# Patient Record
Sex: FEMALE
[De-identification: names, ages, dates, MRNs, and addresses within clinical notes are randomized per-mention and may not be internally consistent; named-entity substitution may affect disease eponyms.]

---

## 2017-08-30 ENCOUNTER — TRANSCRIPTION ENCOUNTER (OUTPATIENT)
Age: 62
End: 2017-08-30

## 2017-08-30 ENCOUNTER — INPATIENT (INPATIENT)
Facility: HOSPITAL | Age: 62
LOS: 0 days | Discharge: HOME | DRG: 419 | End: 2017-08-31
Attending: SPECIALIST | Admitting: SPECIALIST
Payer: MEDICAID

## 2017-08-30 ENCOUNTER — RESULT REVIEW (OUTPATIENT)
Age: 62
End: 2017-08-30

## 2017-08-30 VITALS
SYSTOLIC BLOOD PRESSURE: 146 MMHG | HEIGHT: 60 IN | WEIGHT: 145.51 LBS | HEART RATE: 83 BPM | OXYGEN SATURATION: 97 % | DIASTOLIC BLOOD PRESSURE: 92 MMHG | RESPIRATION RATE: 19 BRPM | TEMPERATURE: 98 F

## 2017-08-30 DIAGNOSIS — K81.9 CHOLECYSTITIS, UNSPECIFIED: ICD-10-CM

## 2017-08-30 DIAGNOSIS — K81.0 ACUTE CHOLECYSTITIS: ICD-10-CM

## 2017-08-30 DIAGNOSIS — Z98.51 TUBAL LIGATION STATUS: Chronic | ICD-10-CM

## 2017-08-30 DIAGNOSIS — Z98.891 HISTORY OF UTERINE SCAR FROM PREVIOUS SURGERY: Chronic | ICD-10-CM

## 2017-08-30 LAB
ALBUMIN SERPL ELPH-MCNC: 4.1 G/DL — SIGNIFICANT CHANGE UP (ref 3.3–5)
ALP SERPL-CCNC: 83 U/L — SIGNIFICANT CHANGE UP (ref 40–120)
ALT FLD-CCNC: 16 U/L — SIGNIFICANT CHANGE UP (ref 12–78)
AMYLASE P1 CFR SERPL: 65 U/L — SIGNIFICANT CHANGE UP (ref 25–115)
ANION GAP SERPL CALC-SCNC: 10 MMOL/L — SIGNIFICANT CHANGE UP (ref 5–17)
APPEARANCE UR: CLEAR — SIGNIFICANT CHANGE UP
AST SERPL-CCNC: 14 U/L — LOW (ref 15–37)
BILIRUB SERPL-MCNC: 0.5 MG/DL — SIGNIFICANT CHANGE UP (ref 0.2–1.2)
BILIRUB UR-MCNC: NEGATIVE — SIGNIFICANT CHANGE UP
BUN SERPL-MCNC: 11 MG/DL — SIGNIFICANT CHANGE UP (ref 7–23)
CALCIUM SERPL-MCNC: 9.2 MG/DL — SIGNIFICANT CHANGE UP (ref 8.5–10.1)
CHLORIDE SERPL-SCNC: 103 MMOL/L — SIGNIFICANT CHANGE UP (ref 96–108)
CO2 SERPL-SCNC: 25 MMOL/L — SIGNIFICANT CHANGE UP (ref 22–31)
COLOR SPEC: YELLOW — SIGNIFICANT CHANGE UP
CREAT SERPL-MCNC: 0.72 MG/DL — SIGNIFICANT CHANGE UP (ref 0.5–1.3)
DIFF PNL FLD: ABNORMAL
GLUCOSE SERPL-MCNC: 106 MG/DL — HIGH (ref 70–99)
GLUCOSE UR QL: NEGATIVE — SIGNIFICANT CHANGE UP
HCT VFR BLD CALC: 45.2 % — HIGH (ref 34.5–45)
HGB BLD-MCNC: 15.6 G/DL — HIGH (ref 11.5–15.5)
KETONES UR-MCNC: NEGATIVE — SIGNIFICANT CHANGE UP
LEUKOCYTE ESTERASE UR-ACNC: ABNORMAL
LIDOCAIN IGE QN: 152 U/L — SIGNIFICANT CHANGE UP (ref 73–393)
MCHC RBC-ENTMCNC: 31.4 PG — SIGNIFICANT CHANGE UP (ref 27–34)
MCHC RBC-ENTMCNC: 34.5 GM/DL — SIGNIFICANT CHANGE UP (ref 32–36)
MCV RBC AUTO: 91.2 FL — SIGNIFICANT CHANGE UP (ref 80–100)
NITRITE UR-MCNC: NEGATIVE — SIGNIFICANT CHANGE UP
PH UR: 7 — SIGNIFICANT CHANGE UP (ref 5–8)
PLATELET # BLD AUTO: 299 K/UL — SIGNIFICANT CHANGE UP (ref 150–400)
POTASSIUM SERPL-MCNC: 3.5 MMOL/L — SIGNIFICANT CHANGE UP (ref 3.5–5.3)
POTASSIUM SERPL-SCNC: 3.5 MMOL/L — SIGNIFICANT CHANGE UP (ref 3.5–5.3)
PROT SERPL-MCNC: 8.2 G/DL — SIGNIFICANT CHANGE UP (ref 6–8.3)
PROT UR-MCNC: NEGATIVE — SIGNIFICANT CHANGE UP
RBC # BLD: 4.95 M/UL — SIGNIFICANT CHANGE UP (ref 3.8–5.2)
RBC # FLD: 12.2 % — SIGNIFICANT CHANGE UP (ref 10.3–14.5)
SODIUM SERPL-SCNC: 138 MMOL/L — SIGNIFICANT CHANGE UP (ref 135–145)
SP GR SPEC: 1 — LOW (ref 1.01–1.02)
UROBILINOGEN FLD QL: NEGATIVE — SIGNIFICANT CHANGE UP
WBC # BLD: 12.7 K/UL — HIGH (ref 3.8–10.5)
WBC # FLD AUTO: 12.7 K/UL — HIGH (ref 3.8–10.5)

## 2017-08-30 PROCEDURE — 76705 ECHO EXAM OF ABDOMEN: CPT | Mod: 26

## 2017-08-30 PROCEDURE — 99285 EMERGENCY DEPT VISIT HI MDM: CPT

## 2017-08-30 PROCEDURE — 74176 CT ABD & PELVIS W/O CONTRAST: CPT | Mod: 26

## 2017-08-30 PROCEDURE — 93010 ELECTROCARDIOGRAM REPORT: CPT

## 2017-08-30 PROCEDURE — 47562 LAPAROSCOPIC CHOLECYSTECTOMY: CPT | Mod: AS

## 2017-08-30 PROCEDURE — 88304 TISSUE EXAM BY PATHOLOGIST: CPT | Mod: 26

## 2017-08-30 RX ORDER — ENOXAPARIN SODIUM 100 MG/ML
40 INJECTION SUBCUTANEOUS DAILY
Qty: 0 | Refills: 0 | Status: DISCONTINUED | OUTPATIENT
Start: 2017-08-30 | End: 2017-08-31

## 2017-08-30 RX ORDER — SODIUM CHLORIDE 9 MG/ML
1000 INJECTION INTRAMUSCULAR; INTRAVENOUS; SUBCUTANEOUS
Qty: 0 | Refills: 0 | Status: DISCONTINUED | OUTPATIENT
Start: 2017-08-30 | End: 2017-08-31

## 2017-08-30 RX ORDER — CEFAZOLIN SODIUM 1 G
2000 VIAL (EA) INJECTION ONCE
Qty: 0 | Refills: 0 | Status: DISCONTINUED | OUTPATIENT
Start: 2017-08-30 | End: 2017-08-30

## 2017-08-30 RX ORDER — ONDANSETRON 8 MG/1
4 TABLET, FILM COATED ORAL
Qty: 0 | Refills: 0 | Status: DISCONTINUED | OUTPATIENT
Start: 2017-08-30 | End: 2017-08-31

## 2017-08-30 RX ORDER — SODIUM CHLORIDE 9 MG/ML
1000 INJECTION INTRAMUSCULAR; INTRAVENOUS; SUBCUTANEOUS ONCE
Qty: 0 | Refills: 0 | Status: COMPLETED | OUTPATIENT
Start: 2017-08-30 | End: 2017-08-30

## 2017-08-30 RX ORDER — ONDANSETRON 8 MG/1
4 TABLET, FILM COATED ORAL ONCE
Qty: 0 | Refills: 0 | Status: COMPLETED | OUTPATIENT
Start: 2017-08-30 | End: 2017-08-30

## 2017-08-30 RX ORDER — KETOROLAC TROMETHAMINE 30 MG/ML
30 SYRINGE (ML) INJECTION ONCE
Qty: 0 | Refills: 0 | Status: DISCONTINUED | OUTPATIENT
Start: 2017-08-30 | End: 2017-08-30

## 2017-08-30 RX ORDER — MORPHINE SULFATE 50 MG/1
2 CAPSULE, EXTENDED RELEASE ORAL EVERY 4 HOURS
Qty: 0 | Refills: 0 | Status: DISCONTINUED | OUTPATIENT
Start: 2017-08-30 | End: 2017-08-31

## 2017-08-30 RX ORDER — HYDROMORPHONE HYDROCHLORIDE 2 MG/ML
0.5 INJECTION INTRAMUSCULAR; INTRAVENOUS; SUBCUTANEOUS
Qty: 0 | Refills: 0 | Status: DISCONTINUED | OUTPATIENT
Start: 2017-08-30 | End: 2017-08-30

## 2017-08-30 RX ORDER — KETOROLAC TROMETHAMINE 30 MG/ML
15 SYRINGE (ML) INJECTION EVERY 6 HOURS
Qty: 0 | Refills: 0 | Status: DISCONTINUED | OUTPATIENT
Start: 2017-08-30 | End: 2017-08-31

## 2017-08-30 RX ORDER — PIPERACILLIN AND TAZOBACTAM 4; .5 G/20ML; G/20ML
3.38 INJECTION, POWDER, LYOPHILIZED, FOR SOLUTION INTRAVENOUS ONCE
Qty: 0 | Refills: 0 | Status: COMPLETED | OUTPATIENT
Start: 2017-08-30 | End: 2017-08-30

## 2017-08-30 RX ORDER — SODIUM CHLORIDE 9 MG/ML
1000 INJECTION, SOLUTION INTRAVENOUS
Qty: 0 | Refills: 0 | Status: DISCONTINUED | OUTPATIENT
Start: 2017-08-30 | End: 2017-08-30

## 2017-08-30 RX ADMIN — Medication 15 MILLIGRAM(S): at 22:22

## 2017-08-30 RX ADMIN — Medication 30 MILLIGRAM(S): at 12:35

## 2017-08-30 RX ADMIN — Medication 200 MILLIGRAM(S): at 18:31

## 2017-08-30 RX ADMIN — SODIUM CHLORIDE 33.33 MILLILITER(S): 9 INJECTION INTRAMUSCULAR; INTRAVENOUS; SUBCUTANEOUS at 12:40

## 2017-08-30 RX ADMIN — ONDANSETRON 4 MILLIGRAM(S): 8 TABLET, FILM COATED ORAL at 12:36

## 2017-08-30 RX ADMIN — Medication 15 MILLIGRAM(S): at 22:55

## 2017-08-30 RX ADMIN — PIPERACILLIN AND TAZOBACTAM 200 GRAM(S): 4; .5 INJECTION, POWDER, LYOPHILIZED, FOR SOLUTION INTRAVENOUS at 14:33

## 2017-08-30 RX ADMIN — Medication 30 MILLIGRAM(S): at 15:10

## 2017-08-30 NOTE — H&P ADULT - PROBLEM SELECTOR PLAN 1
lap/open carrie. The indication for, alternatives to and possible complications of the procedure were discussed with the patient, and all questions were answered

## 2017-08-30 NOTE — ED ADULT NURSE NOTE - CHPI ED SYMPTOMS NEG
no diarrhea/no chills/no fever/no hematuria/no blood in stool/no burning urination/no abdominal distension

## 2017-08-30 NOTE — ED PROVIDER NOTE - OBJECTIVE STATEMENT
63 yo white female with acute onset of right flank/ruq and abdominal pain with nausea all since early morning today. No fever or chills. No dysuria. No CP/SOB

## 2017-08-30 NOTE — ED PROVIDER NOTE - CONSTITUTIONAL, MLM
normal... Female, uncomfortable appearing, well nourished, awake, alert, oriented to person, place, time/situation and in moderate apparent distress.

## 2017-08-30 NOTE — ED ADULT NURSE NOTE - OBJECTIVE STATEMENT
pt stating right flank pain that radiates to the right abdomen since this am at 0300. woke her out of sleep. denies fever, chills, cp, sob, urinary and bowel symptoms. RUQ tender on palpation. soft, non distended.

## 2017-08-31 ENCOUNTER — TRANSCRIPTION ENCOUNTER (OUTPATIENT)
Age: 62
End: 2017-08-31

## 2017-08-31 VITALS
SYSTOLIC BLOOD PRESSURE: 146 MMHG | HEART RATE: 69 BPM | TEMPERATURE: 99 F | DIASTOLIC BLOOD PRESSURE: 81 MMHG | RESPIRATION RATE: 17 BRPM | OXYGEN SATURATION: 98 %

## 2017-08-31 RX ADMIN — Medication 15 MILLIGRAM(S): at 10:58

## 2017-08-31 NOTE — DISCHARGE NOTE ADULT - PATIENT PORTAL LINK FT
“You can access the [facility] Patient Portal, offered by [facility], by registering with the following website: [email or web address]”

## 2017-08-31 NOTE — DISCHARGE NOTE ADULT - NS AS ACTIVITY OBS
Do not drive or operate machinery/OK to drive when no pain/Showering allowed/Walking-Indoors allowed/Walking-Outdoors allowed/Stairs allowed

## 2017-08-31 NOTE — DISCHARGE NOTE ADULT - CARE PROVIDER_API CALL
Jono Miller), [facility]  [address] [place] [address]  Phone: [phone]  Fax: [phone]

## 2017-08-31 NOTE — DISCHARGE NOTE ADULT - CARE PLAN
Principal Discharge DX:	Acute cholecystitis  Goal:	na  Instructions for follow-up, activity and diet:	as above

## 2017-08-31 NOTE — DISCHARGE NOTE ADULT - MEDICATION SUMMARY - MEDICATIONS TO TAKE
I will START or STAY ON the medications listed below when I get home from the hospital:    Tierrarel  --  by mouth   -- Indication: For .

## 2017-09-01 LAB — SURGICAL PATHOLOGY FINAL REPORT - CH: SIGNIFICANT CHANGE UP

## 2017-09-02 LAB
-  AMIKACIN: SIGNIFICANT CHANGE UP
-  AMPICILLIN/SULBACTAM: SIGNIFICANT CHANGE UP
-  AMPICILLIN: SIGNIFICANT CHANGE UP
-  AZTREONAM: SIGNIFICANT CHANGE UP
-  CEFAZOLIN: SIGNIFICANT CHANGE UP
-  CEFEPIME: SIGNIFICANT CHANGE UP
-  CEFOXITIN: SIGNIFICANT CHANGE UP
-  CEFTAZIDIME: SIGNIFICANT CHANGE UP
-  CEFTRIAXONE: SIGNIFICANT CHANGE UP
-  CIPROFLOXACIN: SIGNIFICANT CHANGE UP
-  ERTAPENEM: SIGNIFICANT CHANGE UP
-  GENTAMICIN: SIGNIFICANT CHANGE UP
-  IMIPENEM: SIGNIFICANT CHANGE UP
-  LEVOFLOXACIN: SIGNIFICANT CHANGE UP
-  MEROPENEM: SIGNIFICANT CHANGE UP
-  PIPERACILLIN/TAZOBACTAM: SIGNIFICANT CHANGE UP
-  TOBRAMYCIN: SIGNIFICANT CHANGE UP
-  TRIMETHOPRIM/SULFAMETHOXAZOLE: SIGNIFICANT CHANGE UP
METHOD TYPE: SIGNIFICANT CHANGE UP

## 2017-09-03 DIAGNOSIS — Z88.6 ALLERGY STATUS TO ANALGESIC AGENT: ICD-10-CM

## 2017-09-03 DIAGNOSIS — I10 ESSENTIAL (PRIMARY) HYPERTENSION: ICD-10-CM

## 2017-09-03 DIAGNOSIS — K81.0 ACUTE CHOLECYSTITIS: ICD-10-CM

## 2017-09-03 DIAGNOSIS — Z88.8 ALLERGY STATUS TO OTHER DRUGS, MEDICAMENTS AND BIOLOGICAL SUBSTANCES: ICD-10-CM

## 2017-09-03 DIAGNOSIS — Z88.4 ALLERGY STATUS TO ANESTHETIC AGENT: ICD-10-CM

## 2017-09-03 DIAGNOSIS — Z88.5 ALLERGY STATUS TO NARCOTIC AGENT: ICD-10-CM

## 2017-09-03 DIAGNOSIS — E78.5 HYPERLIPIDEMIA, UNSPECIFIED: ICD-10-CM

## 2017-09-03 DIAGNOSIS — Z88.1 ALLERGY STATUS TO OTHER ANTIBIOTIC AGENTS STATUS: ICD-10-CM

## 2017-09-03 DIAGNOSIS — F17.210 NICOTINE DEPENDENCE, CIGARETTES, UNCOMPLICATED: ICD-10-CM

## 2017-09-05 LAB
CULTURE RESULTS: SIGNIFICANT CHANGE UP
ORGANISM # SPEC MICROSCOPIC CNT: SIGNIFICANT CHANGE UP
ORGANISM # SPEC MICROSCOPIC CNT: SIGNIFICANT CHANGE UP
SPECIMEN SOURCE: SIGNIFICANT CHANGE UP

## 2017-12-18 PROCEDURE — 87070 CULTURE OTHR SPECIMN AEROBIC: CPT

## 2017-12-18 PROCEDURE — 88304 TISSUE EXAM BY PATHOLOGIST: CPT

## 2017-12-18 PROCEDURE — 81001 URINALYSIS AUTO W/SCOPE: CPT

## 2017-12-18 PROCEDURE — 83690 ASSAY OF LIPASE: CPT

## 2017-12-18 PROCEDURE — 80053 COMPREHEN METABOLIC PANEL: CPT

## 2017-12-18 PROCEDURE — 86850 RBC ANTIBODY SCREEN: CPT

## 2017-12-18 PROCEDURE — 93005 ELECTROCARDIOGRAM TRACING: CPT

## 2017-12-18 PROCEDURE — 87186 SC STD MICRODIL/AGAR DIL: CPT

## 2017-12-18 PROCEDURE — 96375 TX/PRO/DX INJ NEW DRUG ADDON: CPT

## 2017-12-18 PROCEDURE — 99285 EMERGENCY DEPT VISIT HI MDM: CPT | Mod: 25

## 2017-12-18 PROCEDURE — 85027 COMPLETE CBC AUTOMATED: CPT

## 2017-12-18 PROCEDURE — 86901 BLOOD TYPING SEROLOGIC RH(D): CPT

## 2017-12-18 PROCEDURE — 86900 BLOOD TYPING SEROLOGIC ABO: CPT

## 2017-12-18 PROCEDURE — 82150 ASSAY OF AMYLASE: CPT

## 2017-12-18 PROCEDURE — 96365 THER/PROPH/DIAG IV INF INIT: CPT

## 2017-12-18 PROCEDURE — 76705 ECHO EXAM OF ABDOMEN: CPT

## 2017-12-18 PROCEDURE — 74176 CT ABD & PELVIS W/O CONTRAST: CPT

## 2018-03-01 ENCOUNTER — OUTPATIENT (OUTPATIENT)
Dept: OUTPATIENT SERVICES | Facility: HOSPITAL | Age: 63
LOS: 1 days | End: 2018-03-01
Payer: MEDICAID

## 2018-03-01 DIAGNOSIS — Z98.891 HISTORY OF UTERINE SCAR FROM PREVIOUS SURGERY: Chronic | ICD-10-CM

## 2018-03-01 DIAGNOSIS — Z98.51 TUBAL LIGATION STATUS: Chronic | ICD-10-CM

## 2018-03-01 PROCEDURE — G9001: CPT

## 2018-03-28 DIAGNOSIS — R69 ILLNESS, UNSPECIFIED: ICD-10-CM

## 2018-03-28 PROBLEM — I10 ESSENTIAL (PRIMARY) HYPERTENSION: Chronic | Status: ACTIVE | Noted: 2017-08-30

## 2018-11-13 ENCOUNTER — TRANSCRIPTION ENCOUNTER (OUTPATIENT)
Age: 63
End: 2018-11-13

## 2020-08-10 ENCOUNTER — APPOINTMENT (OUTPATIENT)
Dept: SURGERY | Facility: CLINIC | Age: 65
End: 2020-08-10
Payer: MEDICAID

## 2020-08-10 DIAGNOSIS — Z86.79 PERSONAL HISTORY OF OTHER DISEASES OF THE CIRCULATORY SYSTEM: ICD-10-CM

## 2020-08-10 DIAGNOSIS — Z78.9 OTHER SPECIFIED HEALTH STATUS: ICD-10-CM

## 2020-08-10 PROCEDURE — 19020 MASTOTOMY EXPL DRG ABSC DP: CPT | Mod: 59

## 2020-08-10 PROCEDURE — 99203 OFFICE O/P NEW LOW 30 MIN: CPT | Mod: 25

## 2020-08-10 RX ORDER — CEPHALEXIN 250 MG/1
250 TABLET ORAL EVERY 6 HOURS
Qty: 28 | Refills: 0 | Status: ACTIVE | COMMUNITY
Start: 2020-08-10 | End: 1900-01-01

## 2020-08-12 NOTE — REVIEW OF SYSTEMS
[Breast Lump] : breast lump [Negative] : Heme/Lymph [Skin Lesions] : no skin lesions [de-identified] : right breast abscess [FreeTextEntry1] : hx of back pain

## 2020-08-12 NOTE — HISTORY OF PRESENT ILLNESS
[FreeTextEntry1] : 65 year old white female who presents  with a right breast abscess.  Pt states she has a long hx of a cyst in the area, she developed pain and swelling over the past four days.  She denies fevers , chills or night sweats.

## 2020-08-12 NOTE — CONSULT LETTER
[Dear  ___] : Dear  [unfilled], [Courtesy Letter:] : I had the pleasure of seeing your patient, [unfilled], in my office today. [Consult Closing:] : Thank you very much for allowing me to participate in the care of this patient.  If you have any questions, please do not hesitate to contact me. [Sincerely,] : Sincerely, [FreeTextEntry1] : The right breast abscess you identified was appreciated.  An incision and drainage was done in the office.  The wound was packed open and a dry sterile dressing was applied. Plans to start Keflex and warm soaks three times a day have been made.  Follow up in the office in one week have been arranged. [FreeTextEntry3] : Dean Coppola MD\par

## 2020-08-12 NOTE — PHYSICAL EXAM
[Normocephalic] : normocephalic [EOMI] : extra ocular movement intact [PERRL] : pupils equal, round and reactive to light [Sclera nonicteric] : sclera nonicteric [Supple] : supple [Normal Sinus Rhythm] : normal sinus rhythm [Clear to Auscultation Bilat] : clear to auscultation bilaterally [No Axillary Lymphadenopathy] : no left axillary lymphadenopathy [No Rashes] : no rashes [Breast Nipple Inversion] : nipples not inverted [Breast Nipple Retraction] : nipples not retracted [Breast Nipple Flattening] : nipples not flattened [de-identified] : breast abscess at 12;00 \par I & D done in office with pus [Breast Nipple Fissures] : nipples not fissured [de-identified] : right breast abscess

## 2020-08-13 ENCOUNTER — APPOINTMENT (OUTPATIENT)
Dept: SURGERY | Facility: CLINIC | Age: 65
End: 2020-08-13
Payer: MEDICAID

## 2020-08-13 VITALS — TEMPERATURE: 98.1 F

## 2020-08-13 PROCEDURE — 99024 POSTOP FOLLOW-UP VISIT: CPT

## 2020-08-14 LAB — BACTERIA WND CULT: ABNORMAL

## 2020-08-20 ENCOUNTER — APPOINTMENT (OUTPATIENT)
Dept: SURGERY | Facility: CLINIC | Age: 65
End: 2020-08-20
Payer: MEDICAID

## 2020-08-20 VITALS — TEMPERATURE: 98.2 F

## 2020-08-20 PROCEDURE — 99024 POSTOP FOLLOW-UP VISIT: CPT

## 2020-08-20 NOTE — HISTORY OF PRESENT ILLNESS
[de-identified] : s/p I and D of breast abscess one week ago [de-identified] : Pt improved, without drainage.  She denies fevers or chills.

## 2020-10-15 ENCOUNTER — APPOINTMENT (OUTPATIENT)
Dept: SURGERY | Facility: CLINIC | Age: 65
End: 2020-10-15
Payer: MEDICAID

## 2020-10-15 VITALS — TEMPERATURE: 97.6 F

## 2020-10-15 PROCEDURE — 99213 OFFICE O/P EST LOW 20 MIN: CPT | Mod: 24

## 2020-10-15 NOTE — HISTORY OF PRESENT ILLNESS
[de-identified] : s/p I and D of breast abscess one week ago [de-identified] : Pt c/o 'the lump is back', denies fevers, chills or night sweats.  Last Mammo was 5/2019

## 2020-10-15 NOTE — PHYSICAL EXAM
[Normal Heart Sounds] : normal heart sounds [Normal Breath Sounds] : Normal breath sounds [Normal Rate and Rhythm] : normal rate and rhythm [No Rash or Lesion] : No rash or lesion [Skin Induration] : induration [Alert] : alert [Oriented to Place] : oriented to place [Oriented to Time] : oriented to time [Oriented to Person] : oriented to person [Calm] : calm [de-identified] : benign [de-identified] : right breast cyst, without other masses, axilla without adenopathy [de-identified] : well developed female in no acute distress [de-identified] : without adenopathy [de-identified] : without calf pain or swelling

## 2020-11-13 ENCOUNTER — APPOINTMENT (OUTPATIENT)
Dept: SURGERY | Facility: HOSPITAL | Age: 65
End: 2020-11-13

## 2020-11-19 ENCOUNTER — APPOINTMENT (OUTPATIENT)
Dept: SURGERY | Facility: CLINIC | Age: 65
End: 2020-11-19
Payer: MEDICAID

## 2020-11-19 VITALS — TEMPERATURE: 97.3 F

## 2020-11-19 DIAGNOSIS — N61.1 ABSCESS OF THE BREAST AND NIPPLE: ICD-10-CM

## 2020-11-19 PROCEDURE — 99213 OFFICE O/P EST LOW 20 MIN: CPT

## 2020-11-19 NOTE — HISTORY OF PRESENT ILLNESS
[FreeTextEntry1] : pt s/p I and D of sebaceous cyst or right  breast, with nodule on breast mammo and sono. pt witlhout palpable masses in the area or nipple discharge

## 2020-11-19 NOTE — ASSESSMENT
[FreeTextEntry1] : sebaceous cyst of the right breast to be electively removed\par nodule of left breast to be followed with a repeat sonogram and exam in six months

## 2020-11-19 NOTE — PHYSICAL EXAM
[Normocephalic] : normocephalic [Atraumatic] : atraumatic [EOMI] : extra ocular movement intact [PERRL] : pupils equal, round and reactive to light [Sclera nonicteric] : sclera nonicteric [Supple] : supple [No Supraclavicular Adenopathy] : no supraclavicular adenopathy [No Cervical Adenopathy] : no cervical adenopathy [Clear to Auscultation Bilat] : clear to auscultation bilaterally [Normal Sinus Rhythm] : normal sinus rhythm [Examined in the supine and seated position] : examined in the supine and seated position [Symmetrical] : symmetrical [No dominant masses] : no dominant masses left breast [No Nipple Retraction] : no left nipple retraction [No Nipple Discharge] : no left nipple discharge [Breast Mass Left Breast ___cm] : no masses [Breast Nipple Inversion] : nipples not inverted [Breast Nipple Retraction] : nipples not retracted [Breast Nipple Flattening] : nipples not flattened [Breast Nipple Fissures] : nipples not fissured [Breast Abnormal Lactation (Galactorrhea)] : no galactorrhea [Breast Abnormal Secretion Bloody Fluid] : no bloody discharge [Breast Abnormal Secretion Serous Fluid] : no serous discharge [Breast Abnormal Secretion Opalescent Fluid] : no milky discharge [No Axillary Lymphadenopathy] : no left axillary lymphadenopathy [Soft] : abdomen soft [Not Tender] : non-tender [No Edema] : no edema [No Rashes] : no rashes [No Ulceration] : no ulceration [de-identified] : sebaceous cyst of breast to be removed electively [de-identified] : lesion on Mammo and sono

## 2021-06-07 ENCOUNTER — APPOINTMENT (OUTPATIENT)
Dept: SURGERY | Facility: CLINIC | Age: 66
End: 2021-06-07
Payer: MEDICARE

## 2021-06-07 VITALS
BODY MASS INDEX: 28.47 KG/M2 | OXYGEN SATURATION: 97 % | TEMPERATURE: 98.7 F | WEIGHT: 145 LBS | HEIGHT: 60 IN | HEART RATE: 83 BPM | SYSTOLIC BLOOD PRESSURE: 153 MMHG | DIASTOLIC BLOOD PRESSURE: 94 MMHG

## 2021-06-07 DIAGNOSIS — F17.200 NICOTINE DEPENDENCE, UNSPECIFIED, UNCOMPLICATED: ICD-10-CM

## 2021-06-07 PROCEDURE — 99212 OFFICE O/P EST SF 10 MIN: CPT

## 2021-06-09 ENCOUNTER — APPOINTMENT (OUTPATIENT)
Dept: SURGERY | Facility: CLINIC | Age: 66
End: 2021-06-09
Payer: MEDICARE

## 2021-06-09 VITALS — TEMPERATURE: 98.3 F

## 2021-06-09 DIAGNOSIS — N63.10 UNSPECIFIED LUMP IN THE RIGHT BREAST, UNSPECIFIED QUADRANT: ICD-10-CM

## 2021-06-09 PROCEDURE — 19120 REMOVAL OF BREAST LESION: CPT

## 2021-06-09 NOTE — PROCEDURE
[FreeTextEntry1] : pre op dxj- right breast cyst\par Post op dx- same\par procedure - Excision of right breast cyst\par Surgeon Dr Coppola\par EBL 10 cc\par Condition - stable\par     Pt was placed in the supine position and the area was properly cleaned and draped. The area around the mass was infiltrated with 1 percent lidocaine with epinephrine and after adequate local anesthesia was administered and 5 by 2 cm elliptical incision was made. Using sharp dissection the mass was completely removed. The area was irrigated and cauterized and after hemostasis was achieved the deep subcutaneous tissues were reapproximated with interrupted 3-0 Vicryl sutures. The skin incisions was closed with a running 4-0 Biosyn suture and a dry sterile dressing was placed. Pt tolerated the procedure without incident.

## 2021-06-09 NOTE — ASSESSMENT
[FreeTextEntry1] : "All risks, benefits and options discussed, pt for excision of her right breast cyst.

## 2021-06-09 NOTE — HISTORY OF PRESENT ILLNESS
[FreeTextEntry1] : 66 year old white female who presents c/o that the right breast cyst has returned. She denies any pain or nipple discharge.

## 2021-06-09 NOTE — PHYSICAL EXAM
[Normocephalic] : normocephalic [Atraumatic] : atraumatic [EOMI] : extra ocular movement intact [PERRL] : pupils equal, round and reactive to light [Supple] : supple [No Supraclavicular Adenopathy] : no supraclavicular adenopathy [No Cervical Adenopathy] : no cervical adenopathy [Clear to Auscultation Bilat] : clear to auscultation bilaterally [Normal Sinus Rhythm] : normal sinus rhythm [Normal S1, S2] : normal S1 and S2 [Examined in the supine and seated position] : examined in the supine and seated position [No dominant masses] : no dominant masses left breast [Symmetrical] : symmetrical [No Nipple Retraction] : no left nipple retraction [No Nipple Discharge] : no left nipple discharge [No Axillary Lymphadenopathy] : no left axillary lymphadenopathy [Soft] : abdomen soft [Not Tender] : non-tender [Normal Bowel Sounds] : normal bowel sounds  [No Edema] : no edema [No Rashes] : no rashes [No Ulceration] : no ulceration [Breast Nipple Inversion] : nipples not inverted [Breast Nipple Retraction] : nipples not retracted [Breast Nipple Flattening] : nipples not flattened [Breast Nipple Fissures] : nipples not fissured [Breast Abnormal Lactation (Galactorrhea)] : no galactorrhea [Breast Abnormal Secretion Bloody Fluid] : no bloody discharge [Breast Abnormal Secretion Serous Fluid] : no serous discharge [Breast Abnormal Secretion Opalescent Fluid] : no milky discharge [de-identified] : well developed white female in no acute distress [de-identified] : recurrent right breast cyst

## 2021-06-15 ENCOUNTER — APPOINTMENT (OUTPATIENT)
Dept: SURGERY | Facility: CLINIC | Age: 66
End: 2021-06-15
Payer: MEDICARE

## 2021-06-15 VITALS — TEMPERATURE: 98.7 F

## 2021-06-15 DIAGNOSIS — N63.10 UNSPECIFIED LUMP IN THE RIGHT BREAST, UNSPECIFIED QUADRANT: ICD-10-CM

## 2021-06-15 PROCEDURE — 99024 POSTOP FOLLOW-UP VISIT: CPT

## 2021-06-15 NOTE — PHYSICAL EXAM
[Normal Breath Sounds] : Normal breath sounds [Normal Heart Sounds] : normal heart sounds [Normal Rate and Rhythm] : normal rate and rhythm [Calm] : calm [de-identified] : well developed female in no distress [de-identified] : nonicteric and noninjected [de-identified] : incision clean and closed, steri strips intact

## 2021-06-15 NOTE — HISTORY OF PRESENT ILLNESS
[de-identified] : s/p   excision of right breast cyst on 6/9/21 [de-identified] : pt without complaints, without pain or swelling. No fevers or chills.

## 2021-06-17 LAB — CORE LAB BIOPSY: NORMAL

## 2021-07-15 ENCOUNTER — NON-APPOINTMENT (OUTPATIENT)
Age: 66
End: 2021-07-15

## 2021-07-15 ENCOUNTER — APPOINTMENT (OUTPATIENT)
Dept: SURGERY | Facility: CLINIC | Age: 66
End: 2021-07-15
Payer: MEDICARE

## 2021-07-15 DIAGNOSIS — N63.0 UNSPECIFIED LUMP IN UNSPECIFIED BREAST: ICD-10-CM

## 2021-07-15 PROCEDURE — 99024 POSTOP FOLLOW-UP VISIT: CPT

## 2021-07-15 NOTE — PHYSICAL EXAM
[Normal Heart Sounds] : normal heart sounds [Normal Rate and Rhythm] : normal rate and rhythm [Calm] : calm [de-identified] : well developed female in no distress [de-identified] : incision clean and healing [de-identified] : benign [de-identified] : without calf pain or swelling

## 2021-07-15 NOTE — HISTORY OF PRESENT ILLNESS
[de-identified] : s/p   excision of right breast cyst on 6/9/21 [de-identified] : pt without complaints, does have some itching in the area.

## 2021-11-16 ENCOUNTER — APPOINTMENT (OUTPATIENT)
Dept: SURGERY | Facility: CLINIC | Age: 66
End: 2021-11-16
Payer: MEDICARE

## 2021-11-16 VITALS — TEMPERATURE: 98 F

## 2021-11-16 DIAGNOSIS — N64.4 MASTODYNIA: ICD-10-CM

## 2021-11-16 PROCEDURE — 99212 OFFICE O/P EST SF 10 MIN: CPT | Mod: 25

## 2021-11-16 PROCEDURE — 96372 THER/PROPH/DIAG INJ SC/IM: CPT

## 2021-11-16 NOTE — PHYSICAL EXAM
[Normocephalic] : normocephalic [Atraumatic] : atraumatic [EOMI] : extra ocular movement intact [PERRL] : pupils equal, round and reactive to light [Sclera nonicteric] : sclera nonicteric [Supple] : supple [No Supraclavicular Adenopathy] : no supraclavicular adenopathy [No Cervical Adenopathy] : no cervical adenopathy [Clear to Auscultation Bilat] : clear to auscultation bilaterally [Normal Sinus Rhythm] : normal sinus rhythm [Normal S1, S2] : normal S1 and S2 [Examined in the supine and seated position] : examined in the supine and seated position [Symmetrical] : symmetrical [No dominant masses] : no dominant masses in right breast  [No dominant masses] : no dominant masses left breast [No Nipple Retraction] : no left nipple retraction [No Nipple Discharge] : no left nipple discharge [Breast Nipple Inversion] : nipples not inverted [Breast Nipple Retraction] : nipples not retracted [Breast Nipple Flattening] : nipples not flattened [Breast Nipple Fissures] : nipples not fissured [Breast Abnormal Lactation (Galactorrhea)] : no galactorrhea [Breast Abnormal Secretion Bloody Fluid] : no bloody discharge [Breast Abnormal Secretion Serous Fluid] : no serous discharge [Breast Abnormal Secretion Opalescent Fluid] : no milky discharge [No Axillary Lymphadenopathy] : no left axillary lymphadenopathy [Soft] : abdomen soft [Not Tender] : non-tender [Normal Bowel Sounds] : normal bowel sounds  [No Edema] : no edema [No Swelling] : no swelling [No Rashes] : no rashes [No Ulceration] : no ulceration

## 2021-11-16 NOTE — HISTORY OF PRESENT ILLNESS
[FreeTextEntry1] : 66 year old white female s/p excision of her right breast cyst 6/21 who presents with pain and swelling in the area. She denies any nipple discharge or lumps.

## 2021-11-16 NOTE — REVIEW OF SYSTEMS
[Skin Lesions] : skin lesion [Skin Wound] : skin wound [Itching] : itching [Change In A Mole] : no change in a mole [Breast Pain] : breast pain [Breast Lump] : no breast lump [Negative] : Heme/Lymph

## 2021-11-16 NOTE — ASSESSMENT
[FreeTextEntry1] : I have discussed with pt her mammo and sonogram findings and recommend an MR of her breasts. Pt states she is clostrophobic and will not have an mr of her breast. Pt for repeat sonogram in six months.\par I have also injected her keloid with Kenalog. \par

## 2021-12-07 ENCOUNTER — APPOINTMENT (OUTPATIENT)
Dept: SURGERY | Facility: CLINIC | Age: 66
End: 2021-12-07
Payer: MEDICARE

## 2021-12-07 VITALS — TEMPERATURE: 97.7 F

## 2021-12-07 DIAGNOSIS — L91.0 HYPERTROPHIC SCAR: ICD-10-CM

## 2021-12-07 PROCEDURE — 96372 THER/PROPH/DIAG INJ SC/IM: CPT

## 2021-12-07 PROCEDURE — 99024 POSTOP FOLLOW-UP VISIT: CPT

## 2021-12-07 NOTE — HISTORY OF PRESENT ILLNESS
[FreeTextEntry1] : 66 year old white female s/p excision of sebaceous cyts of her right breast 8/30/21 who developed a keloid and is returning for a keloid injection.

## 2021-12-07 NOTE — PHYSICAL EXAM
[Normocephalic] : normocephalic [Atraumatic] : atraumatic [EOMI] : extra ocular movement intact [PERRL] : pupils equal, round and reactive to light [Sclera nonicteric] : sclera nonicteric [Supple] : supple [No Supraclavicular Adenopathy] : no supraclavicular adenopathy [No Cervical Adenopathy] : no cervical adenopathy [Clear to Auscultation Bilat] : clear to auscultation bilaterally [Normal Sinus Rhythm] : normal sinus rhythm [Examined in the supine and seated position] : examined in the supine and seated position [Symmetrical] : symmetrical [No dominant masses] : no dominant masses in right breast  [No dominant masses] : no dominant masses left breast [No Nipple Retraction] : no left nipple retraction [No Nipple Discharge] : no left nipple discharge [Breast Mass Right Breast ___cm] : no masses [Breast Mass Left Breast ___cm] : no masses [Breast Nipple Inversion] : nipples not inverted [Breast Nipple Retraction] : nipples not retracted [Breast Nipple Flattening] : nipples not flattened [Breast Nipple Fissures] : nipples not fissured [Breast Abnormal Lactation (Galactorrhea)] : no galactorrhea [Breast Abnormal Secretion Bloody Fluid] : no bloody discharge [Breast Abnormal Secretion Serous Fluid] : no serous discharge [Breast Abnormal Secretion Opalescent Fluid] : no milky discharge [No Axillary Lymphadenopathy] : no left axillary lymphadenopathy [Soft] : abdomen soft [Not Tender] : non-tender [Normal Bowel Sounds] : normal bowel sounds  [No Edema] : no edema [No Rashes] : no rashes [No Ulceration] : no ulceration

## 2023-12-26 ENCOUNTER — NON-APPOINTMENT (OUTPATIENT)
Age: 68
End: 2023-12-26

## 2024-08-28 NOTE — ED ADULT NURSE NOTE - STOOL PATTERN
well developed, well nourished , in no acute distress , ambulating without difficulty , normal communication ability normal patient pattern

## 2025-05-16 ENCOUNTER — NON-APPOINTMENT (OUTPATIENT)
Age: 70
End: 2025-05-16

## 2025-08-01 ENCOUNTER — NON-APPOINTMENT (OUTPATIENT)
Age: 70
End: 2025-08-01

## 2025-08-01 ENCOUNTER — EMERGENCY (EMERGENCY)
Facility: HOSPITAL | Age: 70
LOS: 1 days | End: 2025-08-01
Attending: EMERGENCY MEDICINE | Admitting: EMERGENCY MEDICINE
Payer: COMMERCIAL

## 2025-08-01 VITALS
OXYGEN SATURATION: 99 % | DIASTOLIC BLOOD PRESSURE: 76 MMHG | WEIGHT: 145.06 LBS | HEIGHT: 60 IN | TEMPERATURE: 97 F | SYSTOLIC BLOOD PRESSURE: 149 MMHG | RESPIRATION RATE: 18 BRPM | HEART RATE: 65 BPM

## 2025-08-01 DIAGNOSIS — Z98.891 HISTORY OF UTERINE SCAR FROM PREVIOUS SURGERY: Chronic | ICD-10-CM

## 2025-08-01 DIAGNOSIS — Z98.51 TUBAL LIGATION STATUS: Chronic | ICD-10-CM

## 2025-08-01 LAB
ALBUMIN SERPL ELPH-MCNC: 3.9 G/DL — SIGNIFICANT CHANGE UP (ref 3.3–5)
ALP SERPL-CCNC: 83 U/L — SIGNIFICANT CHANGE UP (ref 40–120)
ALT FLD-CCNC: 21 U/L — SIGNIFICANT CHANGE UP (ref 12–78)
ANION GAP SERPL CALC-SCNC: 6 MMOL/L — SIGNIFICANT CHANGE UP (ref 5–17)
APTT BLD: 30.3 SEC — SIGNIFICANT CHANGE UP (ref 26.1–36.8)
AST SERPL-CCNC: 16 U/L — SIGNIFICANT CHANGE UP (ref 15–37)
BASOPHILS # BLD AUTO: 0.05 K/UL — SIGNIFICANT CHANGE UP (ref 0–0.2)
BASOPHILS NFR BLD AUTO: 0.7 % — SIGNIFICANT CHANGE UP (ref 0–2)
BILIRUB SERPL-MCNC: 0.5 MG/DL — SIGNIFICANT CHANGE UP (ref 0.2–1.2)
BUN SERPL-MCNC: 14 MG/DL — SIGNIFICANT CHANGE UP (ref 7–23)
CALCIUM SERPL-MCNC: 9 MG/DL — SIGNIFICANT CHANGE UP (ref 8.5–10.1)
CHLORIDE SERPL-SCNC: 107 MMOL/L — SIGNIFICANT CHANGE UP (ref 96–108)
CO2 SERPL-SCNC: 29 MMOL/L — SIGNIFICANT CHANGE UP (ref 22–31)
CREAT SERPL-MCNC: 0.61 MG/DL — SIGNIFICANT CHANGE UP (ref 0.5–1.3)
EGFR: 97 ML/MIN/1.73M2 — SIGNIFICANT CHANGE UP
EGFR: 97 ML/MIN/1.73M2 — SIGNIFICANT CHANGE UP
EOSINOPHIL # BLD AUTO: 0.2 K/UL — SIGNIFICANT CHANGE UP (ref 0–0.5)
EOSINOPHIL NFR BLD AUTO: 3 % — SIGNIFICANT CHANGE UP (ref 0–6)
GLUCOSE SERPL-MCNC: 89 MG/DL — SIGNIFICANT CHANGE UP (ref 70–99)
HCT VFR BLD CALC: 42.9 % — SIGNIFICANT CHANGE UP (ref 34.5–45)
HGB BLD-MCNC: 13.9 G/DL — SIGNIFICANT CHANGE UP (ref 11.5–15.5)
IMM GRANULOCYTES # BLD AUTO: 0.02 K/UL — SIGNIFICANT CHANGE UP (ref 0–0.07)
IMM GRANULOCYTES NFR BLD AUTO: 0.3 % — SIGNIFICANT CHANGE UP (ref 0–0.9)
INR BLD: 1.01 RATIO — SIGNIFICANT CHANGE UP (ref 0.85–1.16)
LACTATE SERPL-SCNC: 1.2 MMOL/L — SIGNIFICANT CHANGE UP (ref 0.7–2)
LYMPHOCYTES # BLD AUTO: 1.67 K/UL — SIGNIFICANT CHANGE UP (ref 1–3.3)
LYMPHOCYTES NFR BLD AUTO: 25 % — SIGNIFICANT CHANGE UP (ref 13–44)
MCHC RBC-ENTMCNC: 29.6 PG — SIGNIFICANT CHANGE UP (ref 27–34)
MCHC RBC-ENTMCNC: 32.4 G/DL — SIGNIFICANT CHANGE UP (ref 32–36)
MCV RBC AUTO: 91.5 FL — SIGNIFICANT CHANGE UP (ref 80–100)
MONOCYTES # BLD AUTO: 0.58 K/UL — SIGNIFICANT CHANGE UP (ref 0–0.9)
MONOCYTES NFR BLD AUTO: 8.7 % — SIGNIFICANT CHANGE UP (ref 2–14)
NEUTROPHILS # BLD AUTO: 4.17 K/UL — SIGNIFICANT CHANGE UP (ref 1.8–7.4)
NEUTROPHILS NFR BLD AUTO: 62.3 % — SIGNIFICANT CHANGE UP (ref 43–77)
NRBC # BLD AUTO: 0 K/UL — SIGNIFICANT CHANGE UP (ref 0–0)
NRBC # FLD: 0 K/UL — SIGNIFICANT CHANGE UP (ref 0–0)
NRBC BLD AUTO-RTO: 0 /100 WBCS — SIGNIFICANT CHANGE UP (ref 0–0)
PLATELET # BLD AUTO: 261 K/UL — SIGNIFICANT CHANGE UP (ref 150–400)
PMV BLD: 10.2 FL — SIGNIFICANT CHANGE UP (ref 7–13)
POTASSIUM SERPL-MCNC: 3.5 MMOL/L — SIGNIFICANT CHANGE UP (ref 3.5–5.3)
POTASSIUM SERPL-SCNC: 3.5 MMOL/L — SIGNIFICANT CHANGE UP (ref 3.5–5.3)
PROT SERPL-MCNC: 7.7 G/DL — SIGNIFICANT CHANGE UP (ref 6–8.3)
PROTHROM AB SERPL-ACNC: 11.9 SEC — SIGNIFICANT CHANGE UP (ref 9.9–13.4)
RBC # BLD: 4.69 M/UL — SIGNIFICANT CHANGE UP (ref 3.8–5.2)
RBC # FLD: 13.6 % — SIGNIFICANT CHANGE UP (ref 10.3–14.5)
SODIUM SERPL-SCNC: 142 MMOL/L — SIGNIFICANT CHANGE UP (ref 135–145)
WBC # BLD: 6.69 K/UL — SIGNIFICANT CHANGE UP (ref 3.8–10.5)
WBC # FLD AUTO: 6.69 K/UL — SIGNIFICANT CHANGE UP (ref 3.8–10.5)

## 2025-08-01 PROCEDURE — 73140 X-RAY EXAM OF FINGER(S): CPT | Mod: 26,LT

## 2025-08-01 PROCEDURE — 93005 ELECTROCARDIOGRAM TRACING: CPT

## 2025-08-01 PROCEDURE — 87077 CULTURE AEROBIC IDENTIFY: CPT

## 2025-08-01 PROCEDURE — 80053 COMPREHEN METABOLIC PANEL: CPT

## 2025-08-01 PROCEDURE — 73140 X-RAY EXAM OF FINGER(S): CPT

## 2025-08-01 PROCEDURE — 99285 EMERGENCY DEPT VISIT HI MDM: CPT

## 2025-08-01 PROCEDURE — 83605 ASSAY OF LACTIC ACID: CPT

## 2025-08-01 PROCEDURE — 96374 THER/PROPH/DIAG INJ IV PUSH: CPT

## 2025-08-01 PROCEDURE — 85730 THROMBOPLASTIN TIME PARTIAL: CPT

## 2025-08-01 PROCEDURE — 87150 DNA/RNA AMPLIFIED PROBE: CPT

## 2025-08-01 PROCEDURE — 99285 EMERGENCY DEPT VISIT HI MDM: CPT | Mod: 25

## 2025-08-01 PROCEDURE — 85610 PROTHROMBIN TIME: CPT

## 2025-08-01 PROCEDURE — 93010 ELECTROCARDIOGRAM REPORT: CPT

## 2025-08-01 PROCEDURE — 87040 BLOOD CULTURE FOR BACTERIA: CPT

## 2025-08-01 PROCEDURE — 85025 COMPLETE CBC W/AUTO DIFF WBC: CPT

## 2025-08-01 PROCEDURE — 36415 COLL VENOUS BLD VENIPUNCTURE: CPT

## 2025-08-01 RX ORDER — KETOROLAC TROMETHAMINE 30 MG/ML
15 INJECTION, SOLUTION INTRAMUSCULAR; INTRAVENOUS ONCE
Refills: 0 | Status: DISCONTINUED | OUTPATIENT
Start: 2025-08-01 | End: 2025-08-01

## 2025-08-01 RX ORDER — CEFADROXIL 500 MG/1
1 CAPSULE ORAL
Qty: 14 | Refills: 0
Start: 2025-08-01 | End: 2025-08-07

## 2025-08-01 RX ORDER — AMPICILLIN SODIUM AND SULBACTAM SODIUM 1; .5 G/1; G/1
3 INJECTION, POWDER, FOR SOLUTION INTRAMUSCULAR; INTRAVENOUS ONCE
Refills: 0 | Status: COMPLETED | OUTPATIENT
Start: 2025-08-01 | End: 2025-08-01

## 2025-08-01 RX ADMIN — Medication 2000 MILLILITER(S): at 12:23

## 2025-08-01 RX ADMIN — AMPICILLIN SODIUM AND SULBACTAM SODIUM 200 GRAM(S): 1; .5 INJECTION, POWDER, FOR SOLUTION INTRAMUSCULAR; INTRAVENOUS at 12:23

## 2025-08-02 ENCOUNTER — EMERGENCY (EMERGENCY)
Facility: HOSPITAL | Age: 70
LOS: 1 days | End: 2025-08-02
Attending: INTERNAL MEDICINE | Admitting: STUDENT IN AN ORGANIZED HEALTH CARE EDUCATION/TRAINING PROGRAM
Payer: COMMERCIAL

## 2025-08-02 ENCOUNTER — TRANSCRIPTION ENCOUNTER (OUTPATIENT)
Age: 70
End: 2025-08-02

## 2025-08-02 VITALS
HEART RATE: 68 BPM | OXYGEN SATURATION: 97 % | DIASTOLIC BLOOD PRESSURE: 88 MMHG | HEIGHT: 60 IN | TEMPERATURE: 98 F | WEIGHT: 149.91 LBS | SYSTOLIC BLOOD PRESSURE: 129 MMHG | RESPIRATION RATE: 18 BRPM

## 2025-08-02 DIAGNOSIS — Z98.891 HISTORY OF UTERINE SCAR FROM PREVIOUS SURGERY: Chronic | ICD-10-CM

## 2025-08-02 DIAGNOSIS — Z98.51 TUBAL LIGATION STATUS: Chronic | ICD-10-CM

## 2025-08-02 LAB
-  COAGULASE NEGATIVE STAPHYLOCOCCUS: SIGNIFICANT CHANGE UP
ALBUMIN SERPL ELPH-MCNC: 3.7 G/DL — SIGNIFICANT CHANGE UP (ref 3.3–5)
ALP SERPL-CCNC: 80 U/L — SIGNIFICANT CHANGE UP (ref 40–120)
ALT FLD-CCNC: 19 U/L — SIGNIFICANT CHANGE UP (ref 12–78)
ANION GAP SERPL CALC-SCNC: 7 MMOL/L — SIGNIFICANT CHANGE UP (ref 5–17)
APTT BLD: 30.2 SEC — SIGNIFICANT CHANGE UP (ref 26.1–36.8)
AST SERPL-CCNC: 18 U/L — SIGNIFICANT CHANGE UP (ref 15–37)
BASOPHILS # BLD AUTO: 0.07 K/UL — SIGNIFICANT CHANGE UP (ref 0–0.2)
BASOPHILS NFR BLD AUTO: 0.9 % — SIGNIFICANT CHANGE UP (ref 0–2)
BILIRUB SERPL-MCNC: 0.4 MG/DL — SIGNIFICANT CHANGE UP (ref 0.2–1.2)
BUN SERPL-MCNC: 17 MG/DL — SIGNIFICANT CHANGE UP (ref 7–23)
CALCIUM SERPL-MCNC: 8.8 MG/DL — SIGNIFICANT CHANGE UP (ref 8.5–10.1)
CHLORIDE SERPL-SCNC: 105 MMOL/L — SIGNIFICANT CHANGE UP (ref 96–108)
CO2 SERPL-SCNC: 30 MMOL/L — SIGNIFICANT CHANGE UP (ref 22–31)
CREAT SERPL-MCNC: 0.86 MG/DL — SIGNIFICANT CHANGE UP (ref 0.5–1.3)
EGFR: 73 ML/MIN/1.73M2 — SIGNIFICANT CHANGE UP
EGFR: 73 ML/MIN/1.73M2 — SIGNIFICANT CHANGE UP
EOSINOPHIL # BLD AUTO: 0.27 K/UL — SIGNIFICANT CHANGE UP (ref 0–0.5)
EOSINOPHIL NFR BLD AUTO: 3.5 % — SIGNIFICANT CHANGE UP (ref 0–6)
GLUCOSE SERPL-MCNC: 81 MG/DL — SIGNIFICANT CHANGE UP (ref 70–99)
GRAM STN FLD: ABNORMAL
HCT VFR BLD CALC: 41.5 % — SIGNIFICANT CHANGE UP (ref 34.5–45)
HGB BLD-MCNC: 13.4 G/DL — SIGNIFICANT CHANGE UP (ref 11.5–15.5)
IMM GRANULOCYTES # BLD AUTO: 0.03 K/UL — SIGNIFICANT CHANGE UP (ref 0–0.07)
IMM GRANULOCYTES NFR BLD AUTO: 0.4 % — SIGNIFICANT CHANGE UP (ref 0–0.9)
INR BLD: 1.02 RATIO — SIGNIFICANT CHANGE UP (ref 0.85–1.16)
LACTATE SERPL-SCNC: 1.4 MMOL/L — SIGNIFICANT CHANGE UP (ref 0.7–2)
LYMPHOCYTES # BLD AUTO: 2.3 K/UL — SIGNIFICANT CHANGE UP (ref 1–3.3)
LYMPHOCYTES NFR BLD AUTO: 29.6 % — SIGNIFICANT CHANGE UP (ref 13–44)
MCHC RBC-ENTMCNC: 29.5 PG — SIGNIFICANT CHANGE UP (ref 27–34)
MCHC RBC-ENTMCNC: 32.3 G/DL — SIGNIFICANT CHANGE UP (ref 32–36)
MCV RBC AUTO: 91.2 FL — SIGNIFICANT CHANGE UP (ref 80–100)
METHOD TYPE: SIGNIFICANT CHANGE UP
MONOCYTES # BLD AUTO: 0.68 K/UL — SIGNIFICANT CHANGE UP (ref 0–0.9)
MONOCYTES NFR BLD AUTO: 8.8 % — SIGNIFICANT CHANGE UP (ref 2–14)
NEUTROPHILS # BLD AUTO: 4.42 K/UL — SIGNIFICANT CHANGE UP (ref 1.8–7.4)
NEUTROPHILS NFR BLD AUTO: 56.8 % — SIGNIFICANT CHANGE UP (ref 43–77)
NRBC # BLD AUTO: 0 K/UL — SIGNIFICANT CHANGE UP (ref 0–0)
NRBC # FLD: 0 K/UL — SIGNIFICANT CHANGE UP (ref 0–0)
NRBC BLD AUTO-RTO: 0 /100 WBCS — SIGNIFICANT CHANGE UP (ref 0–0)
PLATELET # BLD AUTO: 263 K/UL — SIGNIFICANT CHANGE UP (ref 150–400)
PMV BLD: 10.3 FL — SIGNIFICANT CHANGE UP (ref 7–13)
POTASSIUM SERPL-MCNC: 3.5 MMOL/L — SIGNIFICANT CHANGE UP (ref 3.5–5.3)
POTASSIUM SERPL-SCNC: 3.5 MMOL/L — SIGNIFICANT CHANGE UP (ref 3.5–5.3)
PROCALCITONIN SERPL-MCNC: <0.02 NG/ML — SIGNIFICANT CHANGE UP
PROT SERPL-MCNC: 7.3 G/DL — SIGNIFICANT CHANGE UP (ref 6–8.3)
PROTHROM AB SERPL-ACNC: 11.9 SEC — SIGNIFICANT CHANGE UP (ref 9.9–13.4)
RBC # BLD: 4.55 M/UL — SIGNIFICANT CHANGE UP (ref 3.8–5.2)
RBC # FLD: 13.6 % — SIGNIFICANT CHANGE UP (ref 10.3–14.5)
SODIUM SERPL-SCNC: 142 MMOL/L — SIGNIFICANT CHANGE UP (ref 135–145)
SPECIMEN SOURCE: SIGNIFICANT CHANGE UP
WBC # BLD: 7.77 K/UL — SIGNIFICANT CHANGE UP (ref 3.8–10.5)
WBC # FLD AUTO: 7.77 K/UL — SIGNIFICANT CHANGE UP (ref 3.8–10.5)

## 2025-08-02 PROCEDURE — 83605 ASSAY OF LACTIC ACID: CPT

## 2025-08-02 PROCEDURE — 84145 PROCALCITONIN (PCT): CPT

## 2025-08-02 PROCEDURE — 85025 COMPLETE CBC W/AUTO DIFF WBC: CPT

## 2025-08-02 PROCEDURE — 85610 PROTHROMBIN TIME: CPT

## 2025-08-02 PROCEDURE — 36415 COLL VENOUS BLD VENIPUNCTURE: CPT

## 2025-08-02 PROCEDURE — 80053 COMPREHEN METABOLIC PANEL: CPT

## 2025-08-02 PROCEDURE — 85730 THROMBOPLASTIN TIME PARTIAL: CPT

## 2025-08-02 PROCEDURE — 99285 EMERGENCY DEPT VISIT HI MDM: CPT

## 2025-08-02 RX ORDER — VANCOMYCIN HCL IN 5 % DEXTROSE 1.5G/250ML
1000 PLASTIC BAG, INJECTION (ML) INTRAVENOUS ONCE
Refills: 0 | Status: COMPLETED | OUTPATIENT
Start: 2025-08-02 | End: 2025-08-02

## 2025-08-02 RX ORDER — PIPERACILLIN-TAZO-DEXTROSE,ISO 3.375G/5
3.38 IV SOLUTION, PIGGYBACK PREMIX FROZEN(ML) INTRAVENOUS ONCE
Refills: 0 | Status: COMPLETED | OUTPATIENT
Start: 2025-08-02 | End: 2025-08-02

## 2025-08-02 RX ADMIN — Medication 2000 MILLILITER(S): at 21:07

## 2025-08-02 RX ADMIN — Medication 200 GRAM(S): at 21:10

## 2025-08-02 RX ADMIN — Medication 250 MILLIGRAM(S): at 22:34

## 2025-08-03 VITALS
DIASTOLIC BLOOD PRESSURE: 74 MMHG | OXYGEN SATURATION: 96 % | TEMPERATURE: 98 F | HEART RATE: 62 BPM | SYSTOLIC BLOOD PRESSURE: 116 MMHG | RESPIRATION RATE: 18 BRPM

## 2025-08-03 LAB
APPEARANCE UR: CLEAR — SIGNIFICANT CHANGE UP
BILIRUB UR-MCNC: NEGATIVE — SIGNIFICANT CHANGE UP
COLOR SPEC: YELLOW — SIGNIFICANT CHANGE UP
CULTURE RESULTS: ABNORMAL
DIFF PNL FLD: ABNORMAL
GLUCOSE UR QL: NEGATIVE MG/DL — SIGNIFICANT CHANGE UP
GRAM STN FLD: SIGNIFICANT CHANGE UP
KETONES UR QL: NEGATIVE MG/DL — SIGNIFICANT CHANGE UP
LEUKOCYTE ESTERASE UR-ACNC: NEGATIVE — SIGNIFICANT CHANGE UP
NITRITE UR-MCNC: NEGATIVE — SIGNIFICANT CHANGE UP
ORGANISM # SPEC MICROSCOPIC CNT: ABNORMAL
ORGANISM # SPEC MICROSCOPIC CNT: SIGNIFICANT CHANGE UP
PH UR: 5.5 — SIGNIFICANT CHANGE UP (ref 5–8)
PROT UR-MCNC: NEGATIVE MG/DL — SIGNIFICANT CHANGE UP
SP GR SPEC: 1.01 — SIGNIFICANT CHANGE UP (ref 1–1.03)
SPECIMEN SOURCE: SIGNIFICANT CHANGE UP
SPECIMEN SOURCE: SIGNIFICANT CHANGE UP
UROBILINOGEN FLD QL: 0.2 MG/DL — SIGNIFICANT CHANGE UP (ref 0.2–1)

## 2025-08-03 PROCEDURE — 96376 TX/PRO/DX INJ SAME DRUG ADON: CPT | Mod: XU

## 2025-08-03 PROCEDURE — 96375 TX/PRO/DX INJ NEW DRUG ADDON: CPT | Mod: XU

## 2025-08-03 PROCEDURE — 84145 PROCALCITONIN (PCT): CPT

## 2025-08-03 PROCEDURE — 87070 CULTURE OTHR SPECIMN AEROBIC: CPT

## 2025-08-03 PROCEDURE — 85610 PROTHROMBIN TIME: CPT

## 2025-08-03 PROCEDURE — 85730 THROMBOPLASTIN TIME PARTIAL: CPT

## 2025-08-03 PROCEDURE — 85025 COMPLETE CBC W/AUTO DIFF WBC: CPT

## 2025-08-03 PROCEDURE — 99284 EMERGENCY DEPT VISIT MOD MDM: CPT | Mod: 25

## 2025-08-03 PROCEDURE — 26011 DRAINAGE OF FINGER ABSCESS: CPT

## 2025-08-03 PROCEDURE — 87040 BLOOD CULTURE FOR BACTERIA: CPT

## 2025-08-03 PROCEDURE — 96374 THER/PROPH/DIAG INJ IV PUSH: CPT | Mod: XU

## 2025-08-03 PROCEDURE — 36415 COLL VENOUS BLD VENIPUNCTURE: CPT

## 2025-08-03 PROCEDURE — 80053 COMPREHEN METABOLIC PANEL: CPT

## 2025-08-03 PROCEDURE — 81001 URINALYSIS AUTO W/SCOPE: CPT

## 2025-08-03 PROCEDURE — 83605 ASSAY OF LACTIC ACID: CPT

## 2025-08-03 RX ORDER — POVIDONE-IODINE 7.5 %
1 SOLUTION, NON-ORAL TOPICAL ONCE
Refills: 0 | Status: COMPLETED | OUTPATIENT
Start: 2025-08-03 | End: 2025-08-03

## 2025-08-03 RX ORDER — VANCOMYCIN HCL IN 5 % DEXTROSE 1.5G/250ML
750 PLASTIC BAG, INJECTION (ML) INTRAVENOUS EVERY 12 HOURS
Refills: 0 | Status: DISCONTINUED | OUTPATIENT
Start: 2025-08-03 | End: 2025-08-05

## 2025-08-03 RX ORDER — PIPERACILLIN-TAZO-DEXTROSE,ISO 3.375G/5
3.38 IV SOLUTION, PIGGYBACK PREMIX FROZEN(ML) INTRAVENOUS EVERY 8 HOURS
Refills: 0 | Status: DISCONTINUED | OUTPATIENT
Start: 2025-08-03 | End: 2025-08-05

## 2025-08-03 RX ORDER — DOXYCYCLINE HYCLATE 100 MG
1 TABLET ORAL
Qty: 14 | Refills: 0
Start: 2025-08-03

## 2025-08-03 RX ADMIN — Medication 1 APPLICATION(S): at 01:34

## 2025-08-03 RX ADMIN — Medication 25 GRAM(S): at 06:54

## 2025-08-06 LAB
CULTURE RESULTS: SIGNIFICANT CHANGE UP
SPECIMEN SOURCE: SIGNIFICANT CHANGE UP

## 2025-08-08 LAB
CULTURE RESULTS: NO GROWTH — SIGNIFICANT CHANGE UP
CULTURE RESULTS: SIGNIFICANT CHANGE UP
CULTURE RESULTS: SIGNIFICANT CHANGE UP
SPECIMEN SOURCE: SIGNIFICANT CHANGE UP